# Patient Record
Sex: FEMALE | Race: WHITE | Employment: UNEMPLOYED
[De-identification: names, ages, dates, MRNs, and addresses within clinical notes are randomized per-mention and may not be internally consistent; named-entity substitution may affect disease eponyms.]

---

## 2023-11-18 ENCOUNTER — APPOINTMENT (OUTPATIENT)
Facility: HOSPITAL | Age: 43
End: 2023-11-18
Payer: COMMERCIAL

## 2023-11-18 ENCOUNTER — HOSPITAL ENCOUNTER (EMERGENCY)
Facility: HOSPITAL | Age: 43
Discharge: HOME | End: 2023-11-18
Attending: STUDENT IN AN ORGANIZED HEALTH CARE EDUCATION/TRAINING PROGRAM
Payer: COMMERCIAL

## 2023-11-18 VITALS
HEIGHT: 66 IN | HEART RATE: 91 BPM | TEMPERATURE: 98.2 F | OXYGEN SATURATION: 98 % | DIASTOLIC BLOOD PRESSURE: 59 MMHG | SYSTOLIC BLOOD PRESSURE: 112 MMHG | WEIGHT: 293 LBS | BODY MASS INDEX: 47.09 KG/M2 | RESPIRATION RATE: 20 BRPM

## 2023-11-18 DIAGNOSIS — K52.9 ENTERITIS: Primary | ICD-10-CM

## 2023-11-18 LAB
ALBUMIN SERPL-MCNC: 2.7 G/DL (ref 3.5–5)
ALBUMIN/GLOB SERPL: 0.6 (ref 1.1–2.2)
ALP SERPL-CCNC: 122 U/L (ref 45–117)
ALT SERPL-CCNC: 30 U/L (ref 12–78)
ANION GAP SERPL CALC-SCNC: 12 MMOL/L (ref 5–15)
APPEARANCE UR: CLEAR
AST SERPL-CCNC: 16 U/L (ref 15–37)
BACTERIA URNS QL MICRO: ABNORMAL /HPF
BASOPHILS # BLD: 0.1 K/UL (ref 0–0.1)
BASOPHILS NFR BLD: 1 % (ref 0–1)
BILIRUB SERPL-MCNC: 0.4 MG/DL (ref 0.2–1)
BILIRUB UR QL CFM: NEGATIVE
BUN SERPL-MCNC: 7 MG/DL (ref 6–20)
BUN/CREAT SERPL: 9 (ref 12–20)
CALCIUM SERPL-MCNC: 8.7 MG/DL (ref 8.5–10.1)
CHLORIDE SERPL-SCNC: 100 MMOL/L (ref 97–108)
CO2 SERPL-SCNC: 25 MMOL/L (ref 21–32)
COLOR UR: ABNORMAL
CREAT SERPL-MCNC: 0.77 MG/DL (ref 0.55–1.02)
DIFFERENTIAL METHOD BLD: ABNORMAL
EOSINOPHIL # BLD: 0.2 K/UL (ref 0–0.4)
EOSINOPHIL NFR BLD: 1 % (ref 0–7)
EPITH CASTS URNS QL MICRO: ABNORMAL /LPF
ERYTHROCYTE [DISTWIDTH] IN BLOOD BY AUTOMATED COUNT: 13.7 % (ref 11.5–14.5)
GLOBULIN SER CALC-MCNC: 4.4 G/DL (ref 2–4)
GLUCOSE SERPL-MCNC: 118 MG/DL (ref 65–100)
GLUCOSE UR STRIP.AUTO-MCNC: NEGATIVE MG/DL
HCG UR QL: NEGATIVE
HCT VFR BLD AUTO: 40.9 % (ref 35–47)
HGB BLD-MCNC: 13.7 G/DL (ref 11.5–16)
HGB UR QL STRIP: ABNORMAL
IMM GRANULOCYTES # BLD AUTO: 0.1 K/UL (ref 0–0.04)
IMM GRANULOCYTES NFR BLD AUTO: 1 % (ref 0–0.5)
KETONES UR QL STRIP.AUTO: ABNORMAL MG/DL
LEUKOCYTE ESTERASE UR QL STRIP.AUTO: ABNORMAL
LIPASE SERPL-CCNC: 23 U/L (ref 13–75)
LYMPHOCYTES # BLD: 2.1 K/UL (ref 0.8–3.5)
LYMPHOCYTES NFR BLD: 11 % (ref 12–49)
MCH RBC QN AUTO: 27 PG (ref 26–34)
MCHC RBC AUTO-ENTMCNC: 33.5 G/DL (ref 30–36.5)
MCV RBC AUTO: 80.7 FL (ref 80–99)
MONOCYTES # BLD: 1.3 K/UL (ref 0–1)
MONOCYTES NFR BLD: 7 % (ref 5–13)
NEUTS SEG # BLD: 14.8 K/UL (ref 1.8–8)
NEUTS SEG NFR BLD: 79 % (ref 32–75)
NITRITE UR QL STRIP.AUTO: POSITIVE
NRBC # BLD: 0 K/UL (ref 0–0.01)
NRBC BLD-RTO: 0 PER 100 WBC
PH UR STRIP: 6 (ref 5–8)
PLATELET # BLD AUTO: 391 K/UL (ref 150–400)
PMV BLD AUTO: 10.9 FL (ref 8.9–12.9)
POTASSIUM SERPL-SCNC: 3.3 MMOL/L (ref 3.5–5.1)
PROT SERPL-MCNC: 7.1 G/DL (ref 6.4–8.2)
PROT UR STRIP-MCNC: ABNORMAL MG/DL
RBC # BLD AUTO: 5.07 M/UL (ref 3.8–5.2)
RBC #/AREA URNS HPF: ABNORMAL /HPF (ref 0–5)
SODIUM SERPL-SCNC: 137 MMOL/L (ref 136–145)
SP GR UR REFRACTOMETRY: 1.02
URINE CULTURE IF INDICATED: ABNORMAL
UROBILINOGEN UR QL STRIP.AUTO: 1 EU/DL (ref 0.2–1)
WBC # BLD AUTO: 18.6 K/UL (ref 3.6–11)
WBC URNS QL MICRO: ABNORMAL /HPF (ref 0–4)

## 2023-11-18 PROCEDURE — 74177 CT ABD & PELVIS W/CONTRAST: CPT

## 2023-11-18 PROCEDURE — 83690 ASSAY OF LIPASE: CPT

## 2023-11-18 PROCEDURE — 96374 THER/PROPH/DIAG INJ IV PUSH: CPT

## 2023-11-18 PROCEDURE — 81001 URINALYSIS AUTO W/SCOPE: CPT

## 2023-11-18 PROCEDURE — 85025 COMPLETE CBC W/AUTO DIFF WBC: CPT

## 2023-11-18 PROCEDURE — 2580000003 HC RX 258: Performed by: STUDENT IN AN ORGANIZED HEALTH CARE EDUCATION/TRAINING PROGRAM

## 2023-11-18 PROCEDURE — 99285 EMERGENCY DEPT VISIT HI MDM: CPT

## 2023-11-18 PROCEDURE — 96361 HYDRATE IV INFUSION ADD-ON: CPT

## 2023-11-18 PROCEDURE — 81025 URINE PREGNANCY TEST: CPT

## 2023-11-18 PROCEDURE — 6360000004 HC RX CONTRAST MEDICATION: Performed by: STUDENT IN AN ORGANIZED HEALTH CARE EDUCATION/TRAINING PROGRAM

## 2023-11-18 PROCEDURE — 36415 COLL VENOUS BLD VENIPUNCTURE: CPT

## 2023-11-18 PROCEDURE — 6360000002 HC RX W HCPCS: Performed by: STUDENT IN AN ORGANIZED HEALTH CARE EDUCATION/TRAINING PROGRAM

## 2023-11-18 PROCEDURE — 80053 COMPREHEN METABOLIC PANEL: CPT

## 2023-11-18 RX ORDER — AMOXICILLIN AND CLAVULANATE POTASSIUM 875; 125 MG/1; MG/1
1 TABLET, FILM COATED ORAL 2 TIMES DAILY
Qty: 14 TABLET | Refills: 0 | Status: SHIPPED | OUTPATIENT
Start: 2023-11-18 | End: 2023-11-18 | Stop reason: SDUPTHER

## 2023-11-18 RX ORDER — ONDANSETRON 4 MG/1
4 TABLET, FILM COATED ORAL 3 TIMES DAILY PRN
Qty: 10 TABLET | Refills: 0 | Status: SHIPPED | OUTPATIENT
Start: 2023-11-18 | End: 2023-11-18 | Stop reason: SDUPTHER

## 2023-11-18 RX ORDER — ATORVASTATIN CALCIUM 10 MG/1
10 TABLET, FILM COATED ORAL DAILY
COMMUNITY
Start: 2022-09-07

## 2023-11-18 RX ORDER — ARIPIPRAZOLE 10 MG/1
10 TABLET ORAL DAILY
COMMUNITY
Start: 2022-05-23

## 2023-11-18 RX ORDER — MORPHINE SULFATE 4 MG/ML
4 INJECTION, SOLUTION INTRAMUSCULAR; INTRAVENOUS
Status: COMPLETED | OUTPATIENT
Start: 2023-11-18 | End: 2023-11-18

## 2023-11-18 RX ORDER — OXYCODONE HYDROCHLORIDE AND ACETAMINOPHEN 5; 325 MG/1; MG/1
1 TABLET ORAL EVERY 6 HOURS PRN
Qty: 9 TABLET | Refills: 0 | Status: SHIPPED | OUTPATIENT
Start: 2023-11-18 | End: 2023-11-18 | Stop reason: SDUPTHER

## 2023-11-18 RX ORDER — AMOXICILLIN AND CLAVULANATE POTASSIUM 875; 125 MG/1; MG/1
1 TABLET, FILM COATED ORAL 2 TIMES DAILY
Qty: 14 TABLET | Refills: 0 | Status: SHIPPED | OUTPATIENT
Start: 2023-11-18 | End: 2023-11-25

## 2023-11-18 RX ORDER — FAMOTIDINE 20 MG/1
TABLET, FILM COATED ORAL
COMMUNITY
Start: 2023-01-12 | End: 2023-11-18

## 2023-11-18 RX ORDER — ONDANSETRON 4 MG/1
4 TABLET, FILM COATED ORAL 3 TIMES DAILY PRN
Qty: 10 TABLET | Refills: 0 | Status: SHIPPED | OUTPATIENT
Start: 2023-11-18

## 2023-11-18 RX ORDER — HYDROXYZINE HYDROCHLORIDE 25 MG/1
TABLET, FILM COATED ORAL
COMMUNITY
Start: 2023-10-24

## 2023-11-18 RX ORDER — 0.9 % SODIUM CHLORIDE 0.9 %
1000 INTRAVENOUS SOLUTION INTRAVENOUS ONCE
Status: COMPLETED | OUTPATIENT
Start: 2023-11-18 | End: 2023-11-18

## 2023-11-18 RX ORDER — MEDROXYPROGESTERONE ACETATE 150 MG/ML
50 INJECTION, SUSPENSION INTRAMUSCULAR
COMMUNITY
End: 2023-11-18

## 2023-11-18 RX ORDER — DICYCLOMINE HCL 20 MG
20-40 TABLET ORAL EVERY 6 HOURS PRN
COMMUNITY
Start: 2023-02-23

## 2023-11-18 RX ORDER — OMEPRAZOLE 40 MG/1
CAPSULE, DELAYED RELEASE ORAL
COMMUNITY
Start: 2022-06-07

## 2023-11-18 RX ORDER — GABAPENTIN 300 MG/1
CAPSULE ORAL
COMMUNITY
Start: 2023-11-14

## 2023-11-18 RX ORDER — OXYCODONE HYDROCHLORIDE AND ACETAMINOPHEN 5; 325 MG/1; MG/1
1 TABLET ORAL EVERY 6 HOURS PRN
Qty: 9 TABLET | Refills: 0 | Status: SHIPPED | OUTPATIENT
Start: 2023-11-18 | End: 2023-11-21

## 2023-11-18 RX ORDER — ADALIMUMAB 40MG/0.4ML
KIT SUBCUTANEOUS
COMMUNITY
Start: 2022-06-08 | End: 2023-11-18

## 2023-11-18 RX ORDER — TOPIRAMATE 50 MG/1
50 TABLET, FILM COATED ORAL DAILY
COMMUNITY
Start: 2022-05-23

## 2023-11-18 RX ADMIN — IOPAMIDOL 100 ML: 755 INJECTION, SOLUTION INTRAVENOUS at 14:45

## 2023-11-18 RX ADMIN — SODIUM CHLORIDE 1000 ML: 9 INJECTION, SOLUTION INTRAVENOUS at 13:20

## 2023-11-18 RX ADMIN — MORPHINE SULFATE 4 MG: 4 INJECTION INTRAVENOUS at 13:21

## 2023-11-18 ASSESSMENT — PAIN DESCRIPTION - PAIN TYPE
TYPE: ACUTE PAIN
TYPE: ACUTE PAIN

## 2023-11-18 ASSESSMENT — PAIN SCALES - GENERAL
PAINLEVEL_OUTOF10: 4
PAINLEVEL_OUTOF10: 6
PAINLEVEL_OUTOF10: 7

## 2023-11-18 ASSESSMENT — PAIN DESCRIPTION - LOCATION
LOCATION: ABDOMEN

## 2023-11-18 ASSESSMENT — PAIN DESCRIPTION - DESCRIPTORS
DESCRIPTORS: STABBING
DESCRIPTORS: SHARP

## 2023-11-18 ASSESSMENT — PAIN DESCRIPTION - FREQUENCY
FREQUENCY: CONTINUOUS
FREQUENCY: CONTINUOUS

## 2023-11-18 ASSESSMENT — PAIN DESCRIPTION - ORIENTATION
ORIENTATION: LEFT;UPPER
ORIENTATION: LEFT;UPPER

## 2023-11-18 ASSESSMENT — PAIN - FUNCTIONAL ASSESSMENT: PAIN_FUNCTIONAL_ASSESSMENT: 0-10

## 2023-11-18 ASSESSMENT — PAIN DESCRIPTION - ONSET: ONSET: ON-GOING

## 2023-11-18 NOTE — ED NOTES
Patient (s)  given copy of dc instructions and 4 script(s). Patient (s)  verbalized understanding of instructions and script (s). Patient given a current medication reconciliation form and verbalized understanding of their medications. Patient (s) verbalized understanding of the importance of discussing medications with his or her physician or clinic they will be following up with. Patient alert and oriented and in no acute distress. Patient discharged home, patient offered wheelchair, patient declines wheelchair.           Nurys Cook RN  11/18/23 1135

## 2023-11-18 NOTE — ED TRIAGE NOTES
Pt presents to the ED c/o LUQ pain and diarrhea x 1.5 weeks. Pt describes pain as stabbing. Pt reports stool has changed from light brown to watery green and \"florescent yellow. \" Pt has been taking Immodium without relief. Pt reports hx of bowel obstruction last year and reports \"It feels the same. \"

## 2023-11-18 NOTE — ED PROVIDER NOTES
primary care doctor within 2 days. Critical Care Time:         Disposition:  Home  {Fer Augustin's  results have been reviewed with her. She has been counseled regarding her diagnosis, treatment, and plan. She verbally conveys understanding and agreement of the signs, symptoms, diagnosis, treatment and prognosis and additionally agrees to follow up as discussed. She also agrees with the care-plan and conveys that all of her questions have been answered. I have also provided discharge instructions for her that include: educational information regarding their diagnosis and treatment, and list of reasons why they would want to return to the ED prior to their follow-up appointment, should her condition change. PLAN:  1. Medication List        START taking these medications      amoxicillin-clavulanate 875-125 MG per tablet  Commonly known as: AUGMENTIN  Take 1 tablet by mouth 2 times daily for 7 days     ondansetron 4 MG tablet  Commonly known as: ZOFRAN  Take 1 tablet by mouth 3 times daily as needed for Nausea or Vomiting     oxyCODONE-acetaminophen 5-325 MG per tablet  Commonly known as: Percocet  Take 1 tablet by mouth every 6 hours as needed for Pain for up to 3 days. Intended supply: 3 days.  Take lowest dose possible to manage pain Max Daily Amount: 4 tablets            STOP taking these medications      Enbrel SureClick 50 MG/ML Soaj  Generic drug: Etanercept     famotidine 20 MG tablet  Commonly known as: PEPCID     Humira Pen 40 MG/0.4ML Pnkt  Generic drug: adalimumab            ASK your doctor about these medications      ARIPiprazole 10 MG tablet  Commonly known as: ABILIFY     atorvastatin 10 MG tablet  Commonly known as: LIPITOR     dicyclomine 20 MG tablet  Commonly known as: BENTYL     gabapentin 300 MG capsule  Commonly known as: NEURONTIN     hydrOXYzine HCl 25 MG tablet  Commonly known as: ATARAX     omeprazole 40 MG delayed release capsule  Commonly known as: PRILOSEC     topiramate 50 MG tablet  Commonly known as: TOPAMAX     TRAZODONE HCL PO               Where to Get Your Medications        These medications were sent to [facility], [address] [phone]  [address] [address]      Phone: [phone]   amoxicillin-clavulanate 875-125 MG per tablet  ondansetron 4 MG tablet  oxyCODONE-acetaminophen 5-325 MG per tablet       2.  Your primary care doctor    Schedule an appointment as soon as possible for a visit in 2 days      [facility] EMERGENCY DEPT  [address] [place] [address]  [phone]    As needed, If symptoms worsen    Return to ED if worse     Diagnosis     Clinical Impression: Acute enteritis               Yovany Benito MD  11/18/23 7838

## 2023-11-18 NOTE — ED NOTES
Patient states she is here today with complaints of upper abdominal pain and diarrhea. She states she has a hx of bowel obstructions and is concerned that she has one again because the symptoms are similar. She states she last had a bowel obstruction 3 years ago.

## 2023-12-11 ENCOUNTER — HOSPITAL ENCOUNTER (OUTPATIENT)
Facility: HOSPITAL | Age: 43
Setting detail: OUTPATIENT SURGERY
Discharge: HOME | End: 2023-12-11
Attending: INTERNAL MEDICINE | Admitting: INTERNAL MEDICINE
Payer: COMMERCIAL

## 2023-12-11 ENCOUNTER — ANESTHESIA EVENT (OUTPATIENT)
Facility: HOSPITAL | Age: 43
End: 2023-12-11
Payer: COMMERCIAL

## 2023-12-11 ENCOUNTER — ANESTHESIA (OUTPATIENT)
Facility: HOSPITAL | Age: 43
End: 2023-12-11
Payer: COMMERCIAL

## 2023-12-11 VITALS
TEMPERATURE: 98.6 F | SYSTOLIC BLOOD PRESSURE: 105 MMHG | DIASTOLIC BLOOD PRESSURE: 65 MMHG | BODY MASS INDEX: 46.41 KG/M2 | WEIGHT: 288.8 LBS | HEART RATE: 88 BPM | RESPIRATION RATE: 18 BRPM | OXYGEN SATURATION: 100 % | HEIGHT: 66 IN

## 2023-12-11 LAB — HCG UR QL: NEGATIVE

## 2023-12-11 PROCEDURE — 7100000011 HC PHASE II RECOVERY - ADDTL 15 MIN: Performed by: INTERNAL MEDICINE

## 2023-12-11 PROCEDURE — 3700000000 HC ANESTHESIA ATTENDED CARE: Performed by: INTERNAL MEDICINE

## 2023-12-11 PROCEDURE — 7100000010 HC PHASE II RECOVERY - FIRST 15 MIN: Performed by: INTERNAL MEDICINE

## 2023-12-11 PROCEDURE — 88305 TISSUE EXAM BY PATHOLOGIST: CPT

## 2023-12-11 PROCEDURE — 6360000002 HC RX W HCPCS: Performed by: NURSE ANESTHETIST, CERTIFIED REGISTERED

## 2023-12-11 PROCEDURE — 3600007502: Performed by: INTERNAL MEDICINE

## 2023-12-11 PROCEDURE — 2500000003 HC RX 250 WO HCPCS: Performed by: NURSE ANESTHETIST, CERTIFIED REGISTERED

## 2023-12-11 PROCEDURE — 3600007512: Performed by: INTERNAL MEDICINE

## 2023-12-11 PROCEDURE — 3700000001 HC ADD 15 MINUTES (ANESTHESIA): Performed by: INTERNAL MEDICINE

## 2023-12-11 PROCEDURE — 81025 URINE PREGNANCY TEST: CPT

## 2023-12-11 RX ORDER — SODIUM CHLORIDE 9 MG/ML
25 INJECTION, SOLUTION INTRAVENOUS PRN
Status: DISCONTINUED | OUTPATIENT
Start: 2023-12-11 | End: 2023-12-11 | Stop reason: HOSPADM

## 2023-12-11 RX ORDER — SODIUM CHLORIDE 0.9 % (FLUSH) 0.9 %
5-40 SYRINGE (ML) INJECTION EVERY 12 HOURS SCHEDULED
Status: DISCONTINUED | OUTPATIENT
Start: 2023-12-11 | End: 2023-12-11 | Stop reason: HOSPADM

## 2023-12-11 RX ORDER — ACETAMINOPHEN 500 MG
500 TABLET ORAL EVERY 6 HOURS PRN
COMMUNITY

## 2023-12-11 RX ORDER — PROPOFOL 10 MG/ML
INJECTION, EMULSION INTRAVENOUS PRN
Status: DISCONTINUED | OUTPATIENT
Start: 2023-12-11 | End: 2023-12-11 | Stop reason: SDUPTHER

## 2023-12-11 RX ORDER — SODIUM CHLORIDE 0.9 % (FLUSH) 0.9 %
5-40 SYRINGE (ML) INJECTION PRN
Status: DISCONTINUED | OUTPATIENT
Start: 2023-12-11 | End: 2023-12-11 | Stop reason: HOSPADM

## 2023-12-11 RX ORDER — LIDOCAINE HYDROCHLORIDE 20 MG/ML
INJECTION, SOLUTION EPIDURAL; INFILTRATION; INTRACAUDAL; PERINEURAL PRN
Status: DISCONTINUED | OUTPATIENT
Start: 2023-12-11 | End: 2023-12-11 | Stop reason: SDUPTHER

## 2023-12-11 RX ADMIN — PROPOFOL 70 MG: 10 INJECTION, EMULSION INTRAVENOUS at 15:09

## 2023-12-11 RX ADMIN — LIDOCAINE HYDROCHLORIDE 100 MG: 20 INJECTION, SOLUTION EPIDURAL; INFILTRATION; INTRACAUDAL at 15:10

## 2023-12-11 RX ADMIN — PROPOFOL 150 MCG/KG/MIN: 10 INJECTION, EMULSION INTRAVENOUS at 15:10

## 2023-12-11 ASSESSMENT — PAIN SCALES - GENERAL
PAINLEVEL_OUTOF10: 0

## 2023-12-11 ASSESSMENT — PAIN - FUNCTIONAL ASSESSMENT: PAIN_FUNCTIONAL_ASSESSMENT: 0-10

## 2023-12-11 NOTE — PROGRESS NOTES
Angela Garcia  1980  [id number]    Situation:  Verbal report received from: RAGHAVENDRA Schneider   Procedure: Procedure(s):  COLONOSCOPY  COLONOSCOPY WITH BIOPSY    Background:    Preoperative diagnosis: Abdominal pain, generalized [R10.84]  Diarrhea, unspecified type [R19.7]  Abnormal findings on diagnostic imaging of digestive system [R93.3]  Postoperative diagnosis: * No post-op diagnosis entered *    :  Dr. Lorenzo Harvey  Assistant(s): Circulator: Rohini Broderick RN  Scrub Person First: Pan Castillo RN    Specimens: [unfilled]  H. Pylori    no    Assessment:  Intra-procedure medications     Anesthesia gave intra-procedure sedation and medications, see anesthesia flow sheet   yes    Intravenous fluids: NS@ KVO     Vital signs stable   yes    Abdominal assessment: round and soft   yes    Recommendation:  Discharge patient per MD order  yes.   Return to floor  outpatient  Family or Friend   sister  Permission to share finding with family or friend   yes

## 2023-12-11 NOTE — H&P
vaccination (refused)  Influenza, seasonal, injectable (refused)  Social History  Alcohol:   Alcohol consumption: Monthly. Tobacco:   Current every day smoker  Drugs:   Recreational or Street Drugs. Exercise:   Exercise less than 3 times a week. Caffeine:   Daily. Family History   No history of Colon Cancer, Colon Polyps, Esophogeal Cancer, GI Cancers, IBD (Crohn's or UC), Liver disease  Review of Systems:  Cardiovascular: Denies chest pain, irregular heart beat, palpitations, peripheral edema, syncope, Sweats. Constitutional: Denies fatigue, fever, loss of appetite, weight gain, weight loss. ENMT: Denies nose bleeds, sore throat, hearing loss. Endocrine: Denies excessive thirst, heat intolerance. Eyes: Denies loss of vision. Gastrointestinal: Presents suffers from abdominal pain, abdominal swelling, change in bowel habits, diarrhea, nausea, stomach cramps. Denies constipation, Bloating/gas, heartburn, jaundice, rectal bleeding, vomiting, dysphagia, rectal pain, Stool incontinence, hematemesis. Genitourinary: Denies dark urine, dysuria, frequent urination, hematuria, incontinence. Hematologic/Lymphatic: Presents suffers from easy bruising. Denies prolonged bleeding. Integumentary: Denies itching, rashes, sun sensitivity. Musculoskeletal: Presents suffers from back pain, stiffness. Denies arthritis, gout, joint pain, muscle weakness. Neurological: Denies dizziness, fainting, frequent headaches, memory loss. Psychiatric: Presents suffers from anxiety, depression, difficulty sleeping. Denies hallucinations, nervousness, panic attacks, paranoia. Respiratory: Denies cough, dyspnea, wheezing. Vital Signs: See nursing notes  Physical Exam:  Constitutional:  Appearance: No distress, appears comfortable. Skin:  Inspection: multiple tattoos. Eyes:  Conjunctivae/lids: Normal.  ENMT:  External: Normal.  Neck:  Neck: Normal appearance, trachea midline.   Respiratory:  Effort: Normal respiratory effort,

## 2023-12-11 NOTE — DISCHARGE INSTRUCTIONS
Nolberto Lepe  [id number]  1980    DISCHARGE INSTRUCTIONS    Impression:  Ileocolonic Crohn's disease    Recommendations:   Sulfasalazine 2 tablets 4 times daily  Obtain over-the-counter for long-term daily use in the vitamin section  1000 mcg vitamin M72  122 mcg folic acid  1163 mg calcium, magnesium, zinc  400 IU vitamin D  Office follow-up 8 weeks  Call office in the next 2 weeks for additional blood testing    Discomfort:  Redness at IV site- apply warm compress to area; if redness or soreness persist- contact your physician. There may be a slight amount of blood passed from the rectum. Gaseous discomfort - walking, belching will help relieve any discomfort. You may not operate a vehicle for 12 hours. You may not engage in an occupation involving machinery or appliances for rest of today. You may not drink alcoholic beverages for at least 12 hours. Avoid making any critical decisions for at least 24 hours. DIET:   High fiber diet. Medications:                Resume usual medications today   ACTIVITY:  You may resume your normal daily activities it is recommended that you spend the remainder of the day resting -  avoid any strenuous activity. CALL M.D.   ANY SIGN OF:   Increasing pain, nausea, vomiting  Abdominal distension (swelling)  New increased bleeding (oral or rectal)  Fever (chills)  Pain in chest area  Bloody discharge from nose or mouth  Shortness of breath     Follow-up Instructions:  Call Dr. Johnathan Mckeon office for follow-up appointment early February        DISCHARGE SUMMARY from Nurse    The following personal items collected during your admission are returned to you:   Dental Appliance:    Vision:    Hearing Aid:    Jewelry:    Clothing:    Other Valuables:    Valuables sent to safe: Dose (mL/hr) Propofol : 0 mL/hr

## 2023-12-11 NOTE — PERIOP NOTE
Initial RN admission and assessment performed and documented in Endoscopy navigator. Patient evaluated by anesthesia in pre-procedure holding. All procedural vital signs, airway assessment, and level of consciousness information monitored and recorded by anesthesia staff on the anesthesia record. Report received from [address]. post procedure. Patient transported to recovery area by RN. Report given to post procedure [place] was pre-cleaned at bedside immediately following procedure by Jocy ([place]).

## 2023-12-11 NOTE — PROGRESS NOTES
Endoscopy discharge instructions have been reviewed and given to patient. The patient verbalized understanding and acceptance of instructions. Dr. Merlin Sánchez discussed with patient procedure findings and next steps.

## 2023-12-11 NOTE — OP NOTE
examined there is much more pronounced erythema, more extensive ulceration, erosion. Specimens:   Ileal biopsies    Implants: None    Complications:   None; patient tolerated the procedure well. Estimated blood loss: none    Impression:  Ileocolonic Crohn's disease    Recommendations:   Sulfasalazine 2 tablets 4 times daily  Obtain over-the-counter for long-term daily use in the vitamin section  1000 mcg vitamin E60  367 mcg folic acid  9451 mg calcium, magnesium, zinc  400 IU vitamin D  Office follow-up 8 weeks  Call office in the next 2 weeks for additional blood testing    Thank you for entrusting me with this patient's care. Please do not hesitate to contact me with any questions or if I can be of assistance with any of your other patients' GI needs.     Signed By: Delio Biswas MD                        December 11, 2023

## 2023-12-11 NOTE — ANESTHESIA POSTPROCEDURE EVALUATION
Department of Anesthesiology  Postprocedure Note    Patient: Li Campbell  MRN: [id number]  YOB: 1980  Date of evaluation: 12/11/2023      Procedure Summary       Date: 12/11/23 Room / Location: [facility] 02 / [facility] ENDOSCOPY    Anesthesia Start: 1504 Anesthesia Stop: 1530    Procedures:       COLONOSCOPY (Lower GI Region)      COLONOSCOPY WITH BIOPSY (Lower GI Region) Diagnosis:       Abdominal pain, generalized      Diarrhea, unspecified type      Abnormal findings on diagnostic imaging of digestive system      (Abdominal pain, generalized [R10.84])      (Diarrhea, unspecified type [R19.7])      (Abnormal findings on diagnostic imaging of digestive system [R93.3])    Surgeons: Jenifer Martínez MD Responsible Provider: Courtney Hamilton MD    Anesthesia Type: MAC ASA Status: 2            Anesthesia Type: No value filed.     Reanna Phase I: Reanna Score: 10    Reanna Phase II: Reanna Score: 10      Anesthesia Post Evaluation    Patient location during evaluation: bedside  Patient participation: complete - patient participated  Level of consciousness: awake and alert and responsive to verbal stimuli  Pain score: 0  Airway patency: patent  Nausea & Vomiting: no nausea and no vomiting  Complications: no  Cardiovascular status: hemodynamically stable  Respiratory status: acceptable and room air  Hydration status: stable  Pain management: adequate

## 2023-12-11 NOTE — ANESTHESIA POSTPROCEDURE EVALUATION
Department of Anesthesiology  Postprocedure Note    Patient: Yan Boone  MRN: [id number]  YOB: 1980  Date of evaluation: 12/11/2023      Procedure Summary       Date: 12/11/23 Room / Location: [facility] ENDO 02 / [facility] ENDOSCOPY    Anesthesia Start: 1504 Anesthesia Stop: 1530    Procedures:       COLONOSCOPY (Lower GI Region)      COLONOSCOPY WITH BIOPSY (Lower GI Region) Diagnosis:       Abdominal pain, generalized      Diarrhea, unspecified type      Abnormal findings on diagnostic imaging of digestive system      (Abdominal pain, generalized [R10.84])      (Diarrhea, unspecified type [R19.7])      (Abnormal findings on diagnostic imaging of digestive system [R93.3])    Surgeons: Pieter Dupree MD Responsible Provider: Brit Bautista MD    Anesthesia Type: MAC ASA Status: 2            Anesthesia Type: No value filed.     Reanna Phase I: Reanna Score: 10    Reanna Phase II: Reanna Score: 10      Anesthesia Post Evaluation